# Patient Record
Sex: FEMALE | Race: WHITE
[De-identification: names, ages, dates, MRNs, and addresses within clinical notes are randomized per-mention and may not be internally consistent; named-entity substitution may affect disease eponyms.]

---

## 2017-08-09 ENCOUNTER — HOSPITAL ENCOUNTER (OUTPATIENT)
Dept: HOSPITAL 45 - X.SURG | Age: 72
Discharge: HOME | End: 2017-08-09
Attending: OPHTHALMOLOGY
Payer: COMMERCIAL

## 2017-08-09 VITALS
WEIGHT: 230.49 LBS | HEIGHT: 62.99 IN | HEIGHT: 62.99 IN | BODY MASS INDEX: 40.84 KG/M2 | BODY MASS INDEX: 40.84 KG/M2 | WEIGHT: 230.49 LBS

## 2017-08-09 VITALS — SYSTOLIC BLOOD PRESSURE: 157 MMHG | HEART RATE: 57 BPM | DIASTOLIC BLOOD PRESSURE: 78 MMHG | OXYGEN SATURATION: 97 %

## 2017-08-09 VITALS — TEMPERATURE: 96.98 F

## 2017-08-09 DIAGNOSIS — H25.12: Primary | ICD-10-CM

## 2017-08-09 DIAGNOSIS — Z79.84: ICD-10-CM

## 2017-08-09 DIAGNOSIS — I10: ICD-10-CM

## 2017-08-09 DIAGNOSIS — Z87.891: ICD-10-CM

## 2017-08-09 DIAGNOSIS — Z79.01: ICD-10-CM

## 2017-08-09 DIAGNOSIS — Z79.899: ICD-10-CM

## 2017-08-09 DIAGNOSIS — E78.00: ICD-10-CM

## 2017-08-09 DIAGNOSIS — F32.9: ICD-10-CM

## 2017-08-09 DIAGNOSIS — D64.9: ICD-10-CM

## 2017-08-09 DIAGNOSIS — E10.9: ICD-10-CM

## 2017-08-09 RX ADMIN — CYCLOPENTOLATE HYDROCHLORIDE SCH DROP: 10 SOLUTION/ DROPS OPHTHALMIC at 07:26

## 2017-08-09 RX ADMIN — CYCLOPENTOLATE HYDROCHLORIDE SCH DROP: 10 SOLUTION/ DROPS OPHTHALMIC at 07:19

## 2017-08-09 RX ADMIN — MOXIFLOXACIN HYDROCHLORIDE SCH DROP: 5 SOLUTION/ DROPS OPHTHALMIC at 07:26

## 2017-08-09 RX ADMIN — PHENYLEPHRINE HYDROCHLORIDE SCH DROP: 25 SOLUTION/ DROPS OPHTHALMIC at 07:12

## 2017-08-09 RX ADMIN — CYCLOPENTOLATE HYDROCHLORIDE SCH DROP: 10 SOLUTION/ DROPS OPHTHALMIC at 07:14

## 2017-08-09 RX ADMIN — MOXIFLOXACIN HYDROCHLORIDE SCH DROP: 5 SOLUTION/ DROPS OPHTHALMIC at 07:20

## 2017-08-09 RX ADMIN — PHENYLEPHRINE HYDROCHLORIDE SCH DROP: 25 SOLUTION/ DROPS OPHTHALMIC at 07:17

## 2017-08-09 RX ADMIN — TROPICAMIDE SCH DROP: 10 SOLUTION/ DROPS OPHTHALMIC at 07:25

## 2017-08-09 RX ADMIN — TROPICAMIDE SCH DROP: 10 SOLUTION/ DROPS OPHTHALMIC at 07:18

## 2017-08-09 RX ADMIN — TROPICAMIDE SCH DROP: 10 SOLUTION/ DROPS OPHTHALMIC at 07:13

## 2017-08-09 RX ADMIN — MOXIFLOXACIN HYDROCHLORIDE SCH DROP: 5 SOLUTION/ DROPS OPHTHALMIC at 07:15

## 2017-08-09 NOTE — [facility] OPERATIVE REPORT
Operative Report


Date of Service


Aug 9, 2017.





Operative Report





Phaco with monofocal IOL





DATE OF OPERATION: 8/9/17





PREOPERATIVE DIAGNOSIS: Senile nuclear cataract, left eye





POSTOPERATIVE DIAGNOSIS: Senile nuclear cataract, left eye





PROCEDURE PERFORMED: Phacoemulsification with intraocular lens implantation, 

left eye





SURGEON: Dr. Nathan Bacon





ANESTHESIA: Topical with 1% intracameral lidocaine and monitored anesthesia care





COMPLICATIONS: None





DESCRIPTION OF PROCEDURE: 


After positively identifying the patient both verbally and by wristband in the 

preoperative area, the left eye was marked as the operative eye. The patient 

was then brought back to the operating room by the anesthesia and nursing staff 

where they were given a drop of Lidocaine and betadine into the operative eye.  

They were then sterilely prepped and draped in the standard fashion typical for 

ophthalmic surgery.  Steri-strips were placed along the upper eyelids to keep 

the lashes back, and a lid speculum was placed into the operative eye.  At this 

point, a documented time out was performed with members of the ophthalmology, 

nursing, and anesthesia staffs all agreeing upon the correct patient, correct 

location for surgery, correct procedure, and correct type and power of 

intraocular lens to be implanted.  


The microscope was then swung into position. First, a paracentesis wound was 

made using a sideport blade. Then, in sequence, 1% preservative-free lidocaine 

followed by Endocoat viscoelastic was injected into the anterior chamber.  Next

, the main incision was made with a keratome blade in triplanar fashion. A 

sharp cystotome was introduced into the eye and used to create a tear in the 

anterior capsule, which was directed into a continuous curvilinear 

capsulorrhexis using Utrata forceps.  Hydrodissection was then performed with 

BSS on a flat-tip cannula.  Next, the phacoemulsification handpiece was 

introduced into the eye and used to remove the nucleus in a divide-and-conquer 

fashion.  This was done without complication and then the irrigation-aspiration 

handpiece was introduced into the eye and used to remove all remaining cortical 

and epinuclear material.  Amvisc was then injected into the anterior chamber as 

well as into the capsular bag and using the lens injector system, an MX60 19.5 

D lens, serial number [id number], and expiration date 3/2020 was injected into 

the capsular bag and rotated into the correct position.  Next, the irrigation-

aspiration handpiece was used to remove all remaining Amvisc.  BSS was used to 

hydrate the main wound, and then BSS was injected into the paracentesis site to 

reach physiologic pressure and then the main wound was checked and found to be 

watertight.  The patient was given drops of Vigamox and Tobradex ointment into 

the operative eye, and then the surrounding area was cleaned and dried.  A 

clear plastic shield was placed over the eye and the patient was then sat up 

and taken from the operating room by the anesthesia staff having tolerated the 

procedure well and suffering no complications.  





DISPOSITION: The patient was returned to the recovery room in stable condition.


I attest to the content of the Intraoperative Record and any orders documented 

therein.  Any exceptions are noted below.

## 2017-08-09 NOTE — DISCHARGE INSTRUCTIONS-[facility]
Discharge Instructions


Date of Service


Aug 9, 2017.





Visit


Reason for Visit:  Cataract Left Eye





Discharge


Discharge Diagnosis / Problem:  left cataract





Discharge Goals


Goal(s):  Decrease discomfort, Improve function





Activity Recommendations


Activity Limitations:  as noted below





Anesthesia


.





Post Anesthesia Instructions:





If you have had General Anesthesia or IV Sedation:





*  Do not drive today.


*  Resume driving when surgeon permits.


*  Do not make important decisions or sign legal documents today.


*  Call surgeon for:





   1.  Temperature elevations greater than 101 degrees F.


   2.  Uncontrollable pain.


   3.  Excessive bleeding.


   4.  Persistent nausea and vomiting.


   5.  Medication intolerance (nausea, vomiting or rash).





*  For nausea and vomiting use only clear liquids such as: tea, soda, bouillon 

until nausea subsides, then gradually increase diet as tolerated.





*  If you have any concerns or questions, call your surgeon's office.  If 

physician is unavailable and it is an emergency, call 911 or go to the nearest 

emergency room.





.





Instructions / Follow-Up


Instructions / Follow-Up








ACTIVITY RECOMMENDATIONS:





*  Light activities.





*  You may walk outside, read, watch television.





*  You may notice redness on the white part of the eye and some blurry vision - 

this is normal. 








MEDICATIONS:





Resume previous medications unless instructed otherwise by your surgeon.





Start all eye drops at 11 am today:





*  Eye drops (today):


   Prednisone - one drop in operative eye every 2 hours while awake


            Ofloxacin - one drop in operative eye every  2 hours while awake


   Bromfenac - one drop in operative eye daily





SPECIAL CARE INSTRUCTIONS:





*  Tape plastic shield over eye to sleep at night.  





Call your doctor at [phone] with any concerns or problems.








FOLLOW UP VISIT:





Follow-up with Dr Bacon at [facility] as scheduled.





Diet Recommendations


Home Diet:  no limitations





Procedures


Procedures Performed:  


Left Cataract Phacoemulsification With Intraocular Lens Implant





Pending Studies


Studies pending at discharge:  no





Medical Emergencies








.


Who to Call and When:





Medical Emergencies:  If at any time you feel your situation is an emergency, 

please call 911 immediately.





.





Non-Emergent Contact


Non-Emergency issues call your:  Surgeon





.


.








"Provider Documentation" section prepared by Nathan Bacon.








.

## 2017-08-09 NOTE — ANESTHESIA PROGRESS NT - [facility]
Anesthesia Post Op Note


Date & Time


Aug 9, 2017 at 09:17





Vital Signs


Pain Intensity:  0





Vital Signs Past 12 Hours








  Date Time  Temp Pulse Resp B/P (MAP) Pulse Ox O2 Delivery O2 Flow Rate FiO2


 


8/9/17 09:12  57 16 157/78 (104) 97 Room Air  


 


8/9/17 08:45 36.1 58 14 155/82 (106) 95 Room Air  


 


8/9/17 07:01 36.4 58 16 184/97 (126) 96 Room Air  











Notes


Mental Status:  alert / awake / arousable, participated in evaluation


Pt Amnestic to Procedure:  Yes


Nausea / Vomiting:  adequately controlled


Pain:  adequately controlled


Airway Patency, RR, SpO2:  stable & adequate


BP & HR:  stable & adequate


Hydration State:  stable & adequate


Anesthetic Complications:  no major complications apparent

## 2017-08-23 ENCOUNTER — HOSPITAL ENCOUNTER (OUTPATIENT)
Dept: HOSPITAL 45 - X.SURG | Age: 72
Discharge: HOME | End: 2017-08-23
Attending: OPHTHALMOLOGY
Payer: COMMERCIAL

## 2017-08-23 VITALS — HEART RATE: 60 BPM | OXYGEN SATURATION: 97 % | SYSTOLIC BLOOD PRESSURE: 195 MMHG | DIASTOLIC BLOOD PRESSURE: 83 MMHG

## 2017-08-23 VITALS
BODY MASS INDEX: 40.84 KG/M2 | HEIGHT: 62.99 IN | BODY MASS INDEX: 40.84 KG/M2 | WEIGHT: 230.49 LBS | HEIGHT: 62.99 IN | WEIGHT: 230.49 LBS

## 2017-08-23 VITALS — TEMPERATURE: 96.98 F

## 2017-08-23 DIAGNOSIS — H25.11: Primary | ICD-10-CM

## 2017-08-23 DIAGNOSIS — D64.9: ICD-10-CM

## 2017-08-23 DIAGNOSIS — E10.9: ICD-10-CM

## 2017-08-23 DIAGNOSIS — E78.00: ICD-10-CM

## 2017-08-23 DIAGNOSIS — I10: ICD-10-CM

## 2017-08-23 DIAGNOSIS — Z87.891: ICD-10-CM

## 2017-08-23 DIAGNOSIS — F32.9: ICD-10-CM

## 2017-08-23 RX ADMIN — MOXIFLOXACIN HYDROCHLORIDE SCH DROP: 5 SOLUTION/ DROPS OPHTHALMIC at 08:04

## 2017-08-23 RX ADMIN — CYCLOPENTOLATE HYDROCHLORIDE SCH DROP: 10 SOLUTION/ DROPS OPHTHALMIC at 07:56

## 2017-08-23 RX ADMIN — CYCLOPENTOLATE HYDROCHLORIDE SCH DROP: 10 SOLUTION/ DROPS OPHTHALMIC at 08:03

## 2017-08-23 RX ADMIN — TROPICAMIDE SCH DROP: 10 SOLUTION/ DROPS OPHTHALMIC at 08:08

## 2017-08-23 RX ADMIN — CYCLOPENTOLATE HYDROCHLORIDE SCH DROP: 10 SOLUTION/ DROPS OPHTHALMIC at 08:09

## 2017-08-23 RX ADMIN — MOXIFLOXACIN HYDROCHLORIDE SCH DROP: 5 SOLUTION/ DROPS OPHTHALMIC at 08:10

## 2017-08-23 RX ADMIN — PHENYLEPHRINE HYDROCHLORIDE SCH DROP: 25 SOLUTION/ DROPS OPHTHALMIC at 07:53

## 2017-08-23 RX ADMIN — TROPICAMIDE SCH DROP: 10 SOLUTION/ DROPS OPHTHALMIC at 07:54

## 2017-08-23 RX ADMIN — MOXIFLOXACIN HYDROCHLORIDE SCH DROP: 5 SOLUTION/ DROPS OPHTHALMIC at 07:58

## 2017-08-23 RX ADMIN — PHENYLEPHRINE HYDROCHLORIDE SCH DROP: 25 SOLUTION/ DROPS OPHTHALMIC at 08:00

## 2017-08-23 RX ADMIN — PHENYLEPHRINE HYDROCHLORIDE SCH DROP: 25 SOLUTION/ DROPS OPHTHALMIC at 08:07

## 2017-08-23 RX ADMIN — TROPICAMIDE SCH DROP: 10 SOLUTION/ DROPS OPHTHALMIC at 08:02

## 2017-08-23 NOTE — DISCHARGE INSTRUCTIONS-[facility]
Discharge Instructions


Date of Service


Aug 23, 2017.





Visit


Reason for Visit:  Cataract Right Eye





Discharge


Discharge Diagnosis / Problem:  right cataract





Discharge Goals


Goal(s):  Decrease discomfort, Improve function





Activity Recommendations


Activity Limitations:  as noted below





Anesthesia


.





Post Anesthesia Instructions:





If you have had General Anesthesia or IV Sedation:





*  Do not drive today.


*  Resume driving when surgeon permits.


*  Do not make important decisions or sign legal documents today.


*  Call surgeon for:





   1.  Temperature elevations greater than 101 degrees F.


   2.  Uncontrollable pain.


   3.  Excessive bleeding.


   4.  Persistent nausea and vomiting.


   5.  Medication intolerance (nausea, vomiting or rash).





*  For nausea and vomiting use only clear liquids such as: tea, soda, bouillon 

until nausea subsides, then gradually increase diet as tolerated.





*  If you have any concerns or questions, call your surgeon's office.  If 

physician is unavailable and it is an emergency, call 911 or go to the nearest 

emergency room.





.





Instructions / Follow-Up


Instructions / Follow-Up





ACTIVITY RECOMMENDATIONS:





*  Light activities.





*  You may walk outside, read, watch television.





*  You may notice redness on the white part of the eye and some blurry vision - 

this is normal. 








MEDICATIONS:





Resume previous medications unless instructed otherwise by your surgeon.





Start all eye drops at 11 am today:





*  Eye drops (today):


   Prednisone - one drop in operative eye every 2 hours while awake


            Ofloxacin - one drop in operative eye every  2 hours while awake


   Bromfenac - one drop in operative eye daily





SPECIAL CARE INSTRUCTIONS:





*  Tape plastic shield over eye to sleep at night.  





Call your doctor at [phone] with any concerns or problems.








FOLLOW UP VISIT:





Follow-up with Dr Bacon at [facility] as scheduled.





Diet Recommendations


Home Diet:  no limitations





Procedures


Procedures Performed:  


Right Cataract Phacoemulsification With Intraocular Lens Implant





Pending Studies


Studies pending at discharge:  no





Medical Emergencies








.


Who to Call and When:





Medical Emergencies:  If at any time you feel your situation is an emergency, 

please call 911 immediately.





.





Non-Emergent Contact


Non-Emergency issues call your:  Surgeon





.


.








"Provider Documentation" section prepared by Nathan Bacon.








.

## 2017-08-23 NOTE — [facility] OPERATIVE REPORT
Operative Report


Date of Service


Aug 23, 2017.





Operative Report





Phaco with monofocal IOL





DATE OF OPERATION: 8/23/17





PREOPERATIVE DIAGNOSIS: Senile nuclear cataract, right eye





POSTOPERATIVE DIAGNOSIS: Senile nuclear cataract, right eye





PROCEDURE PERFORMED: Phacoemulsification with intraocular lens implantation, 

right eye





SURGEON: Dr. Nathan Bacon





ANESTHESIA: Topical with 1% intracameral lidocaine and monitored anesthesia care





COMPLICATIONS: None





DESCRIPTION OF PROCEDURE: 


After positively identifying the patient both verbally and by wristband in the 

preoperative area, the right eye was marked as the operative eye. The patient 

was then brought back to the operating room by the anesthesia and nursing staff 

where they were given a drop of Lidocaine and betadine into the operative eye.  

They were then sterilely prepped and draped in the standard fashion typical for 

ophthalmic surgery.  Steri-strips were placed along the upper eyelids to keep 

the lashes back, and a lid speculum was placed into the operative eye.  At this 

point, a documented time out was performed with members of the ophthalmology, 

nursing, and anesthesia staffs all agreeing upon the correct patient, correct 

location for surgery, correct procedure, and correct type and power of 

intraocular lens to be implanted.  


The microscope was then swung into position. First, a paracentesis wound was 

made using a sideport blade. Then, in sequence, 1% preservative-free lidocaine 

followed by Endocoat viscoelastic was injected into the anterior chamber.  Next

, the main incision was made with a keratome blade in triplanar fashion. A 

sharp cystotome was introduced into the eye and used to create a tear in the 

anterior capsule, which was directed into a continuous curvilinear 

capsulorrhexis using Utrata forceps.  Hydrodissection was then performed with 

BSS on a flat-tip cannula.  Next, the phacoemulsification handpiece was 

introduced into the eye and used to remove the nucleus in a divide-and-conquer 

fashion.  This was done without complication and then the irrigation-aspiration 

handpiece was introduced into the eye and used to remove all remaining cortical 

and epinuclear material.  Amvisc was then injected into the anterior chamber as 

well as into the capsular bag and using the lens injector system, an MX60 20.0 

D lens, serial number [id number], and expiration date 3/2020 was injected into 

the capsular bag and rotated into the correct position.  Next, the irrigation-

aspiration handpiece was used to remove all remaining Amvisc.  BSS was used to 

hydrate the main wound, and then BSS was injected into the paracentesis site to 

reach physiologic pressure and then the main wound was checked and found to be 

watertight.  The patient was given drops of Vigamox and Tobradex ointment into 

the operative eye, and then the surrounding area was cleaned and dried.  A 

clear plastic shield was placed over the eye and the patient was then sat up 

and taken from the operating room by the anesthesia staff having tolerated the 

procedure well and suffering no complications.  





DISPOSITION: The patient was returned to the recovery room in stable condition.


I attest to the content of the Intraoperative Record and any orders documented 

therein.  Any exceptions are noted below.

## 2017-08-23 NOTE — ANESTHESIA PROGRESS NT - [facility]
Anesthesia Post Op Note


Date & Time


Aug 23, 2017 at 08:55





Vital Signs


Pain Intensity:  0





Vital Signs Past 12 Hours








  Date Time  Temp Pulse Resp B/P (MAP) Pulse Ox O2 Delivery O2 Flow Rate FiO2


 


8/23/17 07:19 36.4 67  192/101 (131) 97 Room Air  











Notes


Mental Status:  alert / awake / arousable, participated in evaluation


Pt Amnestic to Procedure:  Yes


Nausea / Vomiting:  adequately controlled


Pain:  adequately controlled


Airway Patency, RR, SpO2:  stable & adequate


BP & HR:  stable & adequate


Hydration State:  stable & adequate


Anesthetic Complications:  no major complications apparent

## 2017-08-23 NOTE — [facility] POST OPERATIVE BRIEF NOTE
Immediate Operative Summary


Operative Date


Aug 23, 2017.





Pre-Operative Diagnosis





Cataract Right Eye





Post-Operative Diagnosis





Same





Procedure(s) Performed





Right Cataract Phacoemulsification With Intraocular Lens Implant





Surgeon


Dr. Bacon





Assistant Surgeon(s)


None





Estimated Blood Loss


0 mL





Findings


right cataract





Specimens





None





Complication(s)


None





Disposition

## 2018-01-30 ENCOUNTER — HOSPITAL ENCOUNTER (OUTPATIENT)
Dept: HOSPITAL 45 - C.MAMM | Age: 73
Discharge: HOME | End: 2018-01-30
Attending: FAMILY MEDICINE
Payer: COMMERCIAL

## 2018-01-30 DIAGNOSIS — Z12.31: Primary | ICD-10-CM

## 2018-01-30 NOTE — MAMMOGRAPHY REPORT
BILATERAL DIGITAL SCREENING MAMMOGRAM TOMOSYNTHESIS WITH CAD: 1/30/2018

CLINICAL HISTORY: Routine screening.  Patient has no complaints.  





TECHNIQUE:  Breast tomosynthesis in addition to standard 2D mammography was performed. Current study 
was also evaluated with a Computer Aided Detection (CAD) system.  



COMPARISON: Comparison is made to exams dated:  12/15/2016 mammogram, 10/21/2015 mammogram, 10/20/201
4 mammogram, 10/18/2013 mammogram, 10/17/2012 mammogram, and 10/3/2011 mammogram - [facility].   



BREAST COMPOSITION:  There are scattered areas of fibroglandular density in both breasts.  



FINDINGS:  No suspicious masses, calcifications, or areas of architectural distortion are noted in ei
ther breast. There has been no significant interval change compared to prior exams. Scattered bilater
al benign-appearing calcifications are not significantly changed.  Benign intramammary lymph nodes in
 bilateral upper outer quadrants are stable.





IMPRESSION:  ACR BI-RADS CATEGORY 2: BENIGN

There is no mammographic evidence of malignancy. A 1 year screening mammogram is recommended.  The pa
tient will receive written notification of the results.  





Approximately 10% of breast cancers are not detected with mammography. A negative mammographic report
 should not delay biopsy if a clinically suggestive mass is present.



Pushpa Pendleton M.D.          

/:1/30/2018 12:17:21  



Imaging Technologist: Valentine Summers, [facility]

letter sent: Normal 1/2  

BI-RADS Code: ACR BI-RADS Category 2: Benign

## 2020-05-26 NOTE — [facility] POST OPERATIVE BRIEF NOTE
Immediate Operative Summary


Operative Date


Aug 9, 2017.





Pre-Operative Diagnosis





Cataract left eye





Post-Operative Diagnosis





Same as pre-op





Procedure(s) Performed





Left Cataract Phacoemulsification With Intraocular Lens Implant





Surgeon


Peggy Bacon





Assistant Surgeon(s)


None





Estimated Blood Loss


None





Findings


left cataract





Specimens





None





Complication(s)


None





Disposition Manual Repair Warning Statement: We plan on removing the manually selected variable below in favor of our much easier automatic structured text blocks found in the previous tab. We decided to do this to help make the flow better and give you the full power of structured data. Manual selection is never going to be ideal in our platform and I would encourage you to avoid using manual selection from this point on, especially since I will be sunsetting this feature. It is important that you do one of two things with the customized text below. First, you can save all of the text in a word file so you can have it for future reference. Second, transfer the text to the appropriate area in the Library tab. Lastly, if there is a flap or graft type which we do not have you need to let us know right away so I can add it in before the variable is hidden. No need to panic, we plan to give you roughly 6 months to make the change.